# Patient Record
Sex: MALE | HISPANIC OR LATINO | URBAN - NONMETROPOLITAN AREA
[De-identification: names, ages, dates, MRNs, and addresses within clinical notes are randomized per-mention and may not be internally consistent; named-entity substitution may affect disease eponyms.]

---

## 2024-01-08 ENCOUNTER — LAB OUTSIDE AN ENCOUNTER (OUTPATIENT)
Dept: URBAN - NONMETROPOLITAN AREA CLINIC 13 | Facility: CLINIC | Age: 54
End: 2024-01-08

## 2024-01-08 ENCOUNTER — OFFICE VISIT (OUTPATIENT)
Dept: URBAN - NONMETROPOLITAN AREA CLINIC 13 | Facility: CLINIC | Age: 54
End: 2024-01-08
Payer: COMMERCIAL

## 2024-01-08 VITALS
WEIGHT: 176 LBS | TEMPERATURE: 98 F | HEIGHT: 66 IN | BODY MASS INDEX: 28.28 KG/M2 | HEART RATE: 68 BPM | SYSTOLIC BLOOD PRESSURE: 130 MMHG | DIASTOLIC BLOOD PRESSURE: 81 MMHG

## 2024-01-08 DIAGNOSIS — K21.9 GASTROESOPHAGEAL REFLUX DISEASE WITHOUT ESOPHAGITIS: ICD-10-CM

## 2024-01-08 DIAGNOSIS — A04.8 H. PYLORI INFECTION: ICD-10-CM

## 2024-01-08 DIAGNOSIS — Z80.0 FAMILY HISTORY OF STOMACH CANCER: ICD-10-CM

## 2024-01-08 PROBLEM — 266435005: Status: ACTIVE | Noted: 2024-01-08

## 2024-01-08 PROCEDURE — 99204 OFFICE O/P NEW MOD 45 MIN: CPT | Performed by: NURSE PRACTITIONER

## 2024-01-08 RX ORDER — PANTOPRAZOLE SODIUM 40 MG/1
1 TABLET TABLET, DELAYED RELEASE ORAL TWICE DAILY
Qty: 60 TABLET | Refills: 6 | OUTPATIENT
Start: 2024-01-08

## 2024-01-08 NOTE — HPI-TODAY'S VISIT:
Wenceslao is a 53-year-old male who presents with worsening reflux.  He was last seen in 2019 with an EGD with gastritis and negative colonoscopy at that time.  He was H. pylori and treated.  He reports that usually if he has some reflux, he takes a PPI for a few days or Pepcid for a few days and this resolves itself.  However, for the last 6 to 8 weeks, despite what he takes, he is having severe indigestion constant and daily.  He has tried omeprazole and Nexium with no improvement.  He has a strong family history of stomach cancer.  Otherwise, he is healthy and works at [employer] Sb

## 2024-01-10 ENCOUNTER — TELEPHONE ENCOUNTER (OUTPATIENT)
Dept: URBAN - NONMETROPOLITAN AREA CLINIC 2 | Facility: CLINIC | Age: 54
End: 2024-01-10

## 2024-01-11 ENCOUNTER — OFFICE VISIT (OUTPATIENT)
Dept: URBAN - NONMETROPOLITAN AREA SURGERY CENTER 1 | Facility: SURGERY CENTER | Age: 54
End: 2024-01-11

## 2024-01-12 ENCOUNTER — LAB OUTSIDE AN ENCOUNTER (OUTPATIENT)
Dept: URBAN - NONMETROPOLITAN AREA CLINIC 2 | Facility: CLINIC | Age: 54
End: 2024-01-12

## 2024-01-18 ENCOUNTER — TELEPHONE ENCOUNTER (OUTPATIENT)
Dept: URBAN - NONMETROPOLITAN AREA CLINIC 2 | Facility: CLINIC | Age: 54
End: 2024-01-18

## 2024-01-18 LAB — H. PYLORI ANTIGEN: DETECTED

## 2024-01-18 RX ORDER — CLARITHROMYCIN 500 MG/1
1 TABLET TABLET, FILM COATED ORAL
Qty: 20 | OUTPATIENT
Start: 2024-01-18 | End: 2024-01-28

## 2024-01-18 RX ORDER — AMOXICILLIN 500 MG/1
2 CAPSULES CAPSULE ORAL
Qty: 40 CAPSULE | OUTPATIENT
Start: 2024-01-18 | End: 2024-01-28

## 2024-01-31 ENCOUNTER — ERX REFILL RESPONSE (OUTPATIENT)
Dept: URBAN - NONMETROPOLITAN AREA CLINIC 2 | Facility: CLINIC | Age: 54
End: 2024-01-31

## 2024-01-31 RX ORDER — PANTOPRAZOLE SODIUM 40 MG/1
TAKE 1 TABLET BY MOUTH TWICE A DAY FOR 30 DAYS TABLET, DELAYED RELEASE ORAL
Qty: 90 TABLET | Refills: 1 | OUTPATIENT

## 2024-02-19 ENCOUNTER — OV EP (OUTPATIENT)
Dept: URBAN - NONMETROPOLITAN AREA CLINIC 13 | Facility: CLINIC | Age: 54
End: 2024-02-19

## 2024-03-04 ENCOUNTER — OV NP (OUTPATIENT)
Dept: URBAN - NONMETROPOLITAN AREA CLINIC 13 | Facility: CLINIC | Age: 54
End: 2024-03-04
Payer: COMMERCIAL

## 2024-03-04 VITALS
HEART RATE: 63 BPM | HEIGHT: 66 IN | SYSTOLIC BLOOD PRESSURE: 125 MMHG | DIASTOLIC BLOOD PRESSURE: 77 MMHG | WEIGHT: 179 LBS | TEMPERATURE: 98.3 F | BODY MASS INDEX: 28.77 KG/M2

## 2024-03-04 DIAGNOSIS — Z80.0 FAMILY HISTORY OF COLON CANCER: ICD-10-CM

## 2024-03-04 DIAGNOSIS — K21.9 GASTROESOPHAGEAL REFLUX DISEASE WITHOUT ESOPHAGITIS: ICD-10-CM

## 2024-03-04 DIAGNOSIS — A04.8 OTHER SPECIFIED BACTERIAL INTESTINAL INFECTIONS: ICD-10-CM

## 2024-03-04 PROCEDURE — 99214 OFFICE O/P EST MOD 30 MIN: CPT | Performed by: NURSE PRACTITIONER

## 2024-03-04 RX ORDER — PANTOPRAZOLE SODIUM 40 MG/1
1 TABLET TABLET, DELAYED RELEASE ORAL ONCE A DAY
Qty: 30 TABLET | Refills: 11 | OUTPATIENT

## 2024-03-04 RX ORDER — FAMOTIDINE 20 MG/1
1 TABLET TABLET, FILM COATED ORAL ONCE A DAY
Qty: 30 TABLET | Refills: 11 | OUTPATIENT
Start: 2024-03-04

## 2024-03-04 RX ORDER — PANTOPRAZOLE SODIUM 40 MG/1
TAKE 1 TABLET BY MOUTH TWICE A DAY FOR 30 DAYS TABLET, DELAYED RELEASE ORAL
Qty: 90 TABLET | Refills: 1 | Status: ACTIVE | COMMUNITY

## 2024-03-04 NOTE — HPI-TODAY'S VISIT:
Wenceslao is a 53-year-old male who presents with worsening reflux.  He was last seen in 2019 with an EGD with gastritis and negative colonoscopy at that time.  He was H. pylori and treated. never confirmed erad. tested again, positive. tx with prevpac again.  He is feeling better today, but still some burning in throat. He is pending EGD in 2 weeks. He has tried omeprazole and Nexium with no improvement.  He has a strong family history of stomach cancer.  Otherwise, he is healthy and works at [employer]. Sb

## 2024-03-14 ENCOUNTER — EGD (OUTPATIENT)
Dept: URBAN - NONMETROPOLITAN AREA SURGERY CENTER 1 | Facility: SURGERY CENTER | Age: 54
End: 2024-03-14

## 2024-05-16 ENCOUNTER — OFFICE VISIT (OUTPATIENT)
Dept: URBAN - NONMETROPOLITAN AREA SURGERY CENTER 1 | Facility: SURGERY CENTER | Age: 54
End: 2024-05-16

## 2024-06-03 ENCOUNTER — OFFICE VISIT (OUTPATIENT)
Dept: URBAN - NONMETROPOLITAN AREA CLINIC 13 | Facility: CLINIC | Age: 54
End: 2024-06-03